# Patient Record
Sex: FEMALE | Race: WHITE | Employment: STUDENT | URBAN - METROPOLITAN AREA
[De-identification: names, ages, dates, MRNs, and addresses within clinical notes are randomized per-mention and may not be internally consistent; named-entity substitution may affect disease eponyms.]

---
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
## 2018-01-26 RX ORDER — PANTOPRAZOLE SODIUM 40 MG/1
40 TABLET, DELAYED RELEASE ORAL DAILY
Qty: 30 TABLET | Refills: 0 | Status: SHIPPED | OUTPATIENT
Start: 2018-01-26 | End: 2018-03-03 | Stop reason: SDUPTHER

## 2018-03-05 RX ORDER — PANTOPRAZOLE SODIUM 40 MG/1
40 TABLET, DELAYED RELEASE ORAL DAILY
Qty: 30 TABLET | Refills: 0 | Status: SHIPPED | OUTPATIENT
Start: 2018-03-05 | End: 2018-07-26 | Stop reason: SDUPTHER

## 2018-04-24 ENCOUNTER — HOSPITAL ENCOUNTER (EMERGENCY)
Age: 18
Discharge: HOME | End: 2018-04-24
Payer: COMMERCIAL

## 2018-04-24 ENCOUNTER — HOSPITAL ENCOUNTER (EMERGENCY)
Dept: GENERAL RADIOLOGY | Age: 18
Discharge: HOME | End: 2018-04-24
Payer: COMMERCIAL

## 2018-04-24 VITALS
DIASTOLIC BLOOD PRESSURE: 86 MMHG | OXYGEN SATURATION: 99 % | HEIGHT: 65 IN | RESPIRATION RATE: 16 BRPM | SYSTOLIC BLOOD PRESSURE: 131 MMHG | TEMPERATURE: 97.8 F | HEART RATE: 86 BPM | WEIGHT: 212 LBS | BODY MASS INDEX: 35.32 KG/M2

## 2018-04-24 DIAGNOSIS — J20.9 ACUTE BRONCHITIS DUE TO INFECTION: Primary | ICD-10-CM

## 2018-04-24 LAB
GROUP A STREP CULTURE, REFLEX: NEGATIVE
REFLEX THROAT C + S: NORMAL

## 2018-04-24 PROCEDURE — 99214 OFFICE O/P EST MOD 30 MIN: CPT

## 2018-04-24 PROCEDURE — 71046 X-RAY EXAM CHEST 2 VIEWS: CPT

## 2018-04-24 PROCEDURE — 87070 CULTURE OTHR SPECIMN AEROBIC: CPT

## 2018-04-24 PROCEDURE — 99213 OFFICE O/P EST LOW 20 MIN: CPT | Performed by: NURSE PRACTITIONER

## 2018-04-24 RX ORDER — AMOXICILLIN AND CLAVULANATE POTASSIUM 875; 125 MG/1; MG/1
1 TABLET, FILM COATED ORAL 2 TIMES DAILY
Qty: 20 TABLET | Refills: 0 | Status: SHIPPED | OUTPATIENT
Start: 2018-04-24 | End: 2018-05-04

## 2018-04-24 RX ORDER — METHYLPREDNISOLONE 4 MG/1
TABLET ORAL
Qty: 1 KIT | Refills: 0 | Status: SHIPPED | OUTPATIENT
Start: 2018-04-24 | End: 2018-06-28

## 2018-04-24 ASSESSMENT — ENCOUNTER SYMPTOMS
SHORTNESS OF BREATH: 1
RHINORRHEA: 0
COUGH: 1
WHEEZING: 1
CONSTIPATION: 0
DIARRHEA: 0
NAUSEA: 0
ABDOMINAL PAIN: 0
PHOTOPHOBIA: 0
VOMITING: 0
SORE THROAT: 1
APNEA: 0
BACK PAIN: 0
SINUS PRESSURE: 0

## 2018-04-26 LAB — THROAT/NOSE CULTURE: NORMAL

## 2018-06-28 ENCOUNTER — HOSPITAL ENCOUNTER (EMERGENCY)
Age: 18
Discharge: HOME | End: 2018-06-28
Payer: COMMERCIAL

## 2018-06-28 VITALS
OXYGEN SATURATION: 98 % | BODY MASS INDEX: 35.92 KG/M2 | HEIGHT: 65 IN | SYSTOLIC BLOOD PRESSURE: 155 MMHG | RESPIRATION RATE: 16 BRPM | DIASTOLIC BLOOD PRESSURE: 78 MMHG | WEIGHT: 215.6 LBS | TEMPERATURE: 97.8 F | HEART RATE: 69 BPM

## 2018-06-28 DIAGNOSIS — N94.10 DYSPAREUNIA IN FEMALE: Primary | ICD-10-CM

## 2018-06-28 DIAGNOSIS — N76.0 VAGINITIS AND VULVOVAGINITIS: ICD-10-CM

## 2018-06-28 LAB
BILIRUBIN URINE: NEGATIVE
BLOOD, URINE: NEGATIVE
CHARACTER, URINE: CLEAR
CHLAMYDIA TRACHOMATIS BY RT-PCR: NOT DETECTED
COLOR: ABNORMAL
CT/NG SOURCE: NORMAL
GLUCOSE, URINE: NEGATIVE MG/DL
KETONES, URINE: NEGATIVE
LEUKOCYTES, UA: ABNORMAL
NEISSERIA GONORRHOEAE BY RT-PCR: NOT DETECTED
NITRATE, UA: NEGATIVE
PH UA: 6 (ref 5–9)
PROTEIN UA: NEGATIVE MG/DL
REFLEX TO URINE C & S: ABNORMAL
SPECIFIC GRAVITY UA: 1.02 (ref 1–1.03)
TRICHOMONAS PREP: NEGATIVE
UROBILINOGEN, URINE: 0.2 EU/DL (ref 0–1)

## 2018-06-28 PROCEDURE — 81003 URINALYSIS AUTO W/O SCOPE: CPT

## 2018-06-28 PROCEDURE — 99214 OFFICE O/P EST MOD 30 MIN: CPT

## 2018-06-28 PROCEDURE — 87252 VIRUS INOCULATION TISSUE: CPT

## 2018-06-28 PROCEDURE — 99213 OFFICE O/P EST LOW 20 MIN: CPT | Performed by: NURSE PRACTITIONER

## 2018-06-28 PROCEDURE — 87253 VIRUS INOCULATE TISSUE ADDL: CPT

## 2018-06-28 PROCEDURE — 87491 CHLMYD TRACH DNA AMP PROBE: CPT

## 2018-06-28 PROCEDURE — 87070 CULTURE OTHR SPECIMN AEROBIC: CPT

## 2018-06-28 PROCEDURE — 87808 TRICHOMONAS ASSAY W/OPTIC: CPT

## 2018-06-28 PROCEDURE — 87205 SMEAR GRAM STAIN: CPT

## 2018-06-28 PROCEDURE — 87591 N.GONORRHOEAE DNA AMP PROB: CPT

## 2018-06-28 PROCEDURE — 87086 URINE CULTURE/COLONY COUNT: CPT

## 2018-06-28 RX ORDER — FLUCONAZOLE 40 MG/ML
150 POWDER, FOR SUSPENSION ORAL NIGHTLY
Qty: 11.4 ML | Refills: 0 | Status: SHIPPED | OUTPATIENT
Start: 2018-06-28 | End: 2018-07-01

## 2018-06-28 RX ORDER — GREEN TEA/HOODIA GORDONII 315-12.5MG
1 CAPSULE ORAL DAILY
Qty: 30 TABLET | Refills: 0 | Status: SHIPPED | OUTPATIENT
Start: 2018-06-28 | End: 2019-10-08 | Stop reason: ALTCHOICE

## 2018-06-28 ASSESSMENT — ENCOUNTER SYMPTOMS
DIARRHEA: 0
CONSTIPATION: 0
ANAL BLEEDING: 0
WHEEZING: 0
RECTAL PAIN: 0
NAUSEA: 0
STRIDOR: 0
ABDOMINAL DISTENTION: 0
VOMITING: 0
APNEA: 0
CHOKING: 0
COLOR CHANGE: 0
BACK PAIN: 1
CHEST TIGHTNESS: 0
BLOOD IN STOOL: 0
ABDOMINAL PAIN: 0

## 2018-06-30 LAB — URINE CULTURE REFLEX: NORMAL

## 2018-07-01 LAB
GENITAL CULTURE, ROUTINE: NORMAL
GRAM STAIN RESULT: NORMAL
HERPES SIMPLEX CULTURE: NORMAL

## 2018-07-26 NOTE — TELEPHONE ENCOUNTER
Date of last visit:  8/18/2017  Date of next visit:  10/12/2018    Requested Prescriptions      No prescriptions requested or ordered in this encounter

## 2018-07-27 RX ORDER — PANTOPRAZOLE SODIUM 40 MG/1
40 TABLET, DELAYED RELEASE ORAL DAILY
Qty: 30 TABLET | Refills: 0 | Status: SHIPPED | OUTPATIENT
Start: 2018-07-27 | End: 2018-11-26 | Stop reason: SDUPTHER

## 2018-10-12 ENCOUNTER — OFFICE VISIT (OUTPATIENT)
Dept: FAMILY MEDICINE CLINIC | Age: 18
End: 2018-10-12

## 2018-10-12 VITALS
HEIGHT: 65 IN | RESPIRATION RATE: 14 BRPM | WEIGHT: 224.2 LBS | BODY MASS INDEX: 37.36 KG/M2 | HEART RATE: 86 BPM | DIASTOLIC BLOOD PRESSURE: 72 MMHG | SYSTOLIC BLOOD PRESSURE: 114 MMHG

## 2018-10-12 DIAGNOSIS — Z13.220 SCREENING FOR HYPERLIPIDEMIA: ICD-10-CM

## 2018-10-12 DIAGNOSIS — E16.2 HYPOGLYCEMIA: ICD-10-CM

## 2018-10-12 DIAGNOSIS — L74.519 EXCESSIVE SWEATING, LOCAL: ICD-10-CM

## 2018-10-12 LAB — GLUCOSE BLD-MCNC: 88 MG/DL

## 2018-10-12 PROCEDURE — 82962 GLUCOSE BLOOD TEST: CPT | Performed by: FAMILY MEDICINE

## 2018-10-12 PROCEDURE — 99213 OFFICE O/P EST LOW 20 MIN: CPT | Performed by: FAMILY MEDICINE

## 2018-10-12 ASSESSMENT — PATIENT HEALTH QUESTIONNAIRE - PHQ9
SUM OF ALL RESPONSES TO PHQ QUESTIONS 1-9: 0
1. LITTLE INTEREST OR PLEASURE IN DOING THINGS: 0
SUM OF ALL RESPONSES TO PHQ QUESTIONS 1-9: 0
2. FEELING DOWN, DEPRESSED OR HOPELESS: 0
SUM OF ALL RESPONSES TO PHQ9 QUESTIONS 1 & 2: 0

## 2018-10-12 ASSESSMENT — ENCOUNTER SYMPTOMS
VOMITING: 0
DIARRHEA: 0
SHORTNESS OF BREATH: 0
CHEST TIGHTNESS: 0
ABDOMINAL PAIN: 0
CONSTIPATION: 0
NAUSEA: 0
COUGH: 0
EYES NEGATIVE: 1

## 2018-10-12 NOTE — PATIENT INSTRUCTIONS
Patient Education        Hypoglycemia: Care Instructions  Your Care Instructions    Hypoglycemia means that your blood sugar is low and your body is not getting enough fuel. Some people get low blood sugar from not eating often enough. Some medicines to treat diabetes can cause low blood sugar. People who have had surgery on their stomachs or intestines may get hypoglycemia. Problems with the pancreas, kidneys, or liver also can cause low blood sugar. A snack or drink with sugar in it will raise your blood sugar and should ease your symptoms right away. Your doctor may recommend that you change or stop your medicines until you can get your blood sugar levels under control. In the long run, you may need to change your diet and eating habits so that you get enough fuel for your body throughout the day. Follow-up care is a key part of your treatment and safety. Be sure to make and go to all appointments, and call your doctor if you are having problems. It's also a good idea to know your test results and keep a list of the medicines you take. How can you care for yourself at home? · Learn to recognize the early signs of low blood sugar. Signs include:  ¨ Nausea. ¨ Hunger. ¨ Feeling nervous, irritable, or shaky. ¨ Cold, clammy, wet skin. ¨ Sweating (when you are not exercising). ¨ A fast heartbeat. ¨ Numbness or tingling of the fingertips or lips. · If you feel an episode of low blood sugar coming on, drink fruit juice or sugared (not diet) soda, or eat sugar in the form of candy, cubes, or tablets. [employer] are another [employer]. · Eat small, frequent meals so that you do not get too hungry between meals. · Balance extra exercise with eating more. · Keep a written record of your low blood sugar episodes, including when you last ate and what you ate, so that you can learn what causes your blood sugar to drop.   · Make sure your family, friends, and coworkers know the symptoms of low blood sugar and

## 2018-10-12 NOTE — PROGRESS NOTES
touch. She states she is just sensitive but this has been going on for long time. Gastrointestinal: Negative for abdominal pain, constipation, diarrhea, nausea and vomiting. Genitourinary: Negative. Musculoskeletal: Negative. Skin: Negative. Negative for rash. Neurological: Negative for dizziness, syncope, weakness, light-headedness, numbness and headaches. Psychiatric/Behavioral: Negative. Objective:   /72 (Site: Right Upper Arm, Position: Sitting, Cuff Size: Medium Adult)   Pulse 86   Resp 14   Ht 5' 5\" (1.651 m)   Wt 224 lb 3.2 oz (101.7 kg)   Breastfeeding? No   BMI 37.31 kg/m²   Wt Readings from Last 3 Encounters:   10/12/18 224 lb 3.2 oz (101.7 kg)   06/28/18 215 lb 9.6 oz (97.8 kg)   04/24/18 212 lb (96.2 kg) (98 %, Z= 2.11)*     * Growth percentiles are based on [facility] 2-20 Years data. Physical Exam   Constitutional: She is oriented to person, place, and time. She appears well-developed and well-nourished. No distress. HENT:   Head: Normocephalic and atraumatic. Eyes: Pupils are equal, round, and reactive to light. Conjunctivae and EOM are normal. Right eye exhibits no discharge. Left eye exhibits no discharge. No scleral icterus. Neck: Normal range of motion. Neck supple. No JVD present. No thyromegaly present. Cardiovascular: Normal rate, regular rhythm and normal heart sounds. No murmur heard. Pulmonary/Chest: Breath sounds normal. No respiratory distress. She has no wheezes. She has no rhonchi. She has no rales. Abdominal: Soft. Bowel sounds are normal. She exhibits no distension and no mass. There is no hepatosplenomegaly. There is no tenderness. There is no rebound and no guarding. Musculoskeletal: Normal range of motion. Lymphadenopathy:     She has no cervical adenopathy. Neurological: She is alert and oriented to person, place, and time. Skin: Skin is warm and dry. No rash noted. She is not diaphoretic.    Psychiatric: She has a normal mood and affect. Her behavior is normal.   Nursing note and vitals reviewed. Assessment:       Diagnosis Orders   1. Hypoglycemia  POCT Glucose    Comprehensive Metabolic Panel    CBC with Differential    TSH with Reflex   2. Excessive sweating, local  Comprehensive Metabolic Panel    CBC with Differential    TSH with Reflex   3. Screening for hyperlipidemia  Lipid Panel       Plan:      Current Outpatient Prescriptions   Medication Sig Dispense Refill    pantoprazole (PROTONIX) 40 MG tablet Take 1 tablet by mouth daily 30 tablet 0    Lactobacillus (PROBIOTIC ACIDOPHILUS) TABS Take 1 tablet by mouth daily 30 tablet 0    DICYCLOMINE HCL PO Take by mouth      SPRINTEC 28 0.25-35 MG-MCG per tablet TAKE 1 TABLET BY MOUTH EVERY DAY  11    ibuprofen (ADVIL;MOTRIN) 600 MG tablet Take 600 mg by mouth every 6 hours as needed for Pain       No current facility-administered medications for this visit. Orders Placed This Encounter   Procedures    Comprehensive Metabolic Panel     Standing Status:   Future     Standing Expiration Date:   10/12/2019    CBC with Differential     Standing Status:   Future     Standing Expiration Date:   10/12/2019    Lipid Panel     Standing Status:   Future     Standing Expiration Date:   10/12/2019     Order Specific Question:   Is Patient Fasting?/# of Hours     Answer:   yes 12 hours    TSH with Reflex     Standing Status:   Future     Standing Expiration Date:   10/12/2019    POCT Glucose     No results found for: LABA1C  No results found for: Mernelll Boy  Results for POC orders placed in visit on 10/12/18   POCT Glucose   Result Value Ref Range    Glucose 88 mg/dL   Fasting today. Monitor blood sugars when she has symptoms. Sofia Bello Keep log of blood sugars and bring with you to the next appointment. All patient questions answered. Pt voiced understanding. Instructed to make sure she eats 3 meals with 3 snacks in between. Patient agreed with treatment plan.      Return in

## 2018-10-19 ENCOUNTER — OFFICE VISIT (OUTPATIENT)
Dept: FAMILY MEDICINE CLINIC | Age: 18
End: 2018-10-19

## 2018-10-19 VITALS
RESPIRATION RATE: 16 BRPM | SYSTOLIC BLOOD PRESSURE: 122 MMHG | DIASTOLIC BLOOD PRESSURE: 74 MMHG | BODY MASS INDEX: 37.65 KG/M2 | WEIGHT: 226 LBS | HEIGHT: 65 IN | HEART RATE: 68 BPM

## 2018-10-19 DIAGNOSIS — E16.2 HYPOGLYCEMIA: Primary | ICD-10-CM

## 2018-10-19 DIAGNOSIS — K21.9 GASTROESOPHAGEAL REFLUX DISEASE, ESOPHAGITIS PRESENCE NOT SPECIFIED: ICD-10-CM

## 2018-10-19 PROCEDURE — 99213 OFFICE O/P EST LOW 20 MIN: CPT | Performed by: FAMILY MEDICINE

## 2018-10-19 ASSESSMENT — ENCOUNTER SYMPTOMS
VOMITING: 0
CONSTIPATION: 0
CHEST TIGHTNESS: 0
SHORTNESS OF BREATH: 0
EYES NEGATIVE: 1
COUGH: 0
DIARRHEA: 0
NAUSEA: 0
ABDOMINAL PAIN: 0

## 2018-10-19 NOTE — PROGRESS NOTES
tablet Take 600 mg by mouth every 6 hours as needed for Pain       No current facility-administered medications for this visit. Orders Placed This Encounter   Procedures    External Referral To Endocrinology     Referral Priority:   Routine     Referral Type:   Eval and Treat     Referral Reason:   Specialty Services Required     Referred to Provider:   Pramod Casarez MD     Requested Specialty:   Endocrinology     Number of Visits Requested:   1     Continue current medications. Return if symptoms worsen or fail to improve. Discusseduse, benefit, and side effects of prescribed medications. All patient questionsanswered. Pt voiced understanding. Instructed to continue current medications,diet and exercise. Patient agreed with treatment plan. She has not done her blood work yet.

## 2018-10-22 ENCOUNTER — TELEPHONE (OUTPATIENT)
Dept: FAMILY MEDICINE CLINIC | Age: 18
End: 2018-10-22

## 2018-11-26 DIAGNOSIS — N39.0 URINARY TRACT INFECTION WITH HEMATURIA, SITE UNSPECIFIED: Primary | ICD-10-CM

## 2018-11-26 DIAGNOSIS — R31.9 URINARY TRACT INFECTION WITH HEMATURIA, SITE UNSPECIFIED: Primary | ICD-10-CM

## 2018-11-26 RX ORDER — PANTOPRAZOLE SODIUM 40 MG/1
40 TABLET, DELAYED RELEASE ORAL DAILY
Qty: 30 TABLET | Refills: 1 | Status: SHIPPED | OUTPATIENT
Start: 2018-11-26

## 2018-11-26 RX ORDER — SULFAMETHOXAZOLE AND TRIMETHOPRIM 800; 160 MG/1; MG/1
1 TABLET ORAL 2 TIMES DAILY
Qty: 14 TABLET | Refills: 0 | Status: SHIPPED | OUTPATIENT
Start: 2018-11-26 | End: 2018-12-06

## 2018-11-26 NOTE — TELEPHONE ENCOUNTER
Patient called req ref of Pantoprazole to [facility]. Also C/O hematuria, burns with urination, frequency and only little bit at a time. Urgency constantly. Just seen in October and not able to come in for an appt. Req RX to [facility].   Please call her [phone]

## 2018-11-27 ENCOUNTER — HOSPITAL ENCOUNTER (OUTPATIENT)
Age: 18
Setting detail: SPECIMEN
Discharge: HOME | End: 2018-11-27
Payer: COMMERCIAL

## 2018-11-27 LAB
BACTERIA: ABNORMAL /HPF
BILIRUBIN URINE: NEGATIVE
BLOOD, URINE: ABNORMAL
CASTS UA: ABNORMAL /LPF
CHARACTER, URINE: ABNORMAL
COLOR: YELLOW
CRYSTALS, UA: ABNORMAL
EPITHELIAL CELLS, UA: ABNORMAL /HPF
GLUCOSE URINE: NEGATIVE MG/DL
KETONES, URINE: NEGATIVE
LEUKOCYTE ESTERASE, URINE: ABNORMAL
NITRITE, URINE: POSITIVE
PH UA: 5.5
PROTEIN UA: 30
RBC URINE: ABNORMAL /HPF
RENAL EPITHELIAL, UA: ABNORMAL
SPECIFIC GRAVITY, URINE: 1.02 (ref 1–1.03)
UROBILINOGEN, URINE: 0.2 EU/DL
WBC UA: ABNORMAL /HPF
YEAST: ABNORMAL

## 2018-11-27 PROCEDURE — 81001 URINALYSIS AUTO W/SCOPE: CPT

## 2018-11-27 PROCEDURE — 87186 SC STD MICRODIL/AGAR DIL: CPT

## 2018-11-27 PROCEDURE — 87184 SC STD DISK METHOD PER PLATE: CPT

## 2018-11-27 PROCEDURE — 87086 URINE CULTURE/COLONY COUNT: CPT

## 2018-11-27 PROCEDURE — 87077 CULTURE AEROBIC IDENTIFY: CPT

## 2018-11-29 ENCOUNTER — TELEPHONE (OUTPATIENT)
Dept: FAMILY MEDICINE CLINIC | Age: 18
End: 2018-11-29

## 2018-11-29 LAB
ORGANISM: ABNORMAL
URINE CULTURE REFLEX: ABNORMAL

## 2019-01-17 ENCOUNTER — HOSPITAL ENCOUNTER (EMERGENCY)
Age: 19
Discharge: HOME | End: 2019-01-17
Payer: COMMERCIAL

## 2019-01-17 VITALS
OXYGEN SATURATION: 99 % | BODY MASS INDEX: 36.96 KG/M2 | HEART RATE: 112 BPM | WEIGHT: 230 LBS | RESPIRATION RATE: 16 BRPM | TEMPERATURE: 98.2 F | DIASTOLIC BLOOD PRESSURE: 85 MMHG | HEIGHT: 66 IN | SYSTOLIC BLOOD PRESSURE: 142 MMHG

## 2019-01-17 DIAGNOSIS — J02.9 VIRAL PHARYNGITIS: Primary | ICD-10-CM

## 2019-01-17 LAB
GROUP A STREP CULTURE, REFLEX: NEGATIVE
REFLEX THROAT C + S: NORMAL

## 2019-01-17 PROCEDURE — 87070 CULTURE OTHR SPECIMN AEROBIC: CPT

## 2019-01-17 PROCEDURE — 99213 OFFICE O/P EST LOW 20 MIN: CPT | Performed by: NURSE PRACTITIONER

## 2019-01-17 PROCEDURE — 99213 OFFICE O/P EST LOW 20 MIN: CPT

## 2019-01-17 ASSESSMENT — ENCOUNTER SYMPTOMS
CHEST TIGHTNESS: 0
SINUS PRESSURE: 0
ABDOMINAL PAIN: 0
SORE THROAT: 1
SHORTNESS OF BREATH: 0
RHINORRHEA: 0
NAUSEA: 0
VOMITING: 0
DIARRHEA: 0

## 2019-01-17 ASSESSMENT — PAIN - FUNCTIONAL ASSESSMENT: PAIN_FUNCTIONAL_ASSESSMENT: ACTIVITIES ARE NOT PREVENTED

## 2019-01-17 ASSESSMENT — PAIN DESCRIPTION - LOCATION: LOCATION: THROAT

## 2019-01-17 ASSESSMENT — PAIN SCALES - GENERAL: PAINLEVEL_OUTOF10: 8

## 2019-01-17 ASSESSMENT — PAIN DESCRIPTION - PAIN TYPE: TYPE: ACUTE PAIN

## 2019-01-19 LAB — THROAT/NOSE CULTURE: NORMAL

## 2019-10-08 ENCOUNTER — HOSPITAL ENCOUNTER (EMERGENCY)
Age: 19
Discharge: HOME | End: 2019-10-08
Payer: COMMERCIAL

## 2019-10-08 VITALS
HEART RATE: 83 BPM | DIASTOLIC BLOOD PRESSURE: 83 MMHG | RESPIRATION RATE: 16 BRPM | WEIGHT: 237 LBS | SYSTOLIC BLOOD PRESSURE: 156 MMHG | OXYGEN SATURATION: 96 % | BODY MASS INDEX: 39.49 KG/M2 | HEIGHT: 65 IN | TEMPERATURE: 98.6 F

## 2019-10-08 DIAGNOSIS — N30.01 ACUTE CYSTITIS WITH HEMATURIA: Primary | ICD-10-CM

## 2019-10-08 LAB
BILIRUBIN URINE: NEGATIVE
BLOOD, URINE: ABNORMAL
CHARACTER, URINE: ABNORMAL
COLOR: ABNORMAL
GLUCOSE, URINE: NEGATIVE MG/DL
KETONES, URINE: NEGATIVE
LEUKOCYTES, UA: ABNORMAL
NITRATE, UA: POSITIVE
PH UA: 5.5 (ref 5–9)
PROTEIN UA: >= 300 MG/DL
REFLEX TO URINE C & S: ABNORMAL
SPECIFIC GRAVITY UA: >= 1.03 (ref 1–1.03)
UROBILINOGEN, URINE: 0.2 EU/DL (ref 0–1)

## 2019-10-08 PROCEDURE — 99213 OFFICE O/P EST LOW 20 MIN: CPT

## 2019-10-08 PROCEDURE — 87086 URINE CULTURE/COLONY COUNT: CPT

## 2019-10-08 PROCEDURE — 87077 CULTURE AEROBIC IDENTIFY: CPT

## 2019-10-08 PROCEDURE — 81003 URINALYSIS AUTO W/O SCOPE: CPT

## 2019-10-08 PROCEDURE — 99213 OFFICE O/P EST LOW 20 MIN: CPT | Performed by: NURSE PRACTITIONER

## 2019-10-08 PROCEDURE — 87186 SC STD MICRODIL/AGAR DIL: CPT

## 2019-10-08 RX ORDER — SULFAMETHOXAZOLE AND TRIMETHOPRIM 800; 160 MG/1; MG/1
1 TABLET ORAL 2 TIMES DAILY
Qty: 10 TABLET | Refills: 0 | Status: SHIPPED | OUTPATIENT
Start: 2019-10-08 | End: 2019-10-13

## 2019-10-08 ASSESSMENT — ENCOUNTER SYMPTOMS
BACK PAIN: 1
SHORTNESS OF BREATH: 0
VOMITING: 0
ABDOMINAL PAIN: 1
NAUSEA: 0

## 2019-10-08 ASSESSMENT — PAIN DESCRIPTION - PROGRESSION: CLINICAL_PROGRESSION: GRADUALLY WORSENING

## 2019-10-08 ASSESSMENT — PAIN DESCRIPTION - PAIN TYPE: TYPE: ACUTE PAIN

## 2019-10-08 ASSESSMENT — PAIN DESCRIPTION - LOCATION: LOCATION: BACK

## 2019-10-08 ASSESSMENT — PAIN DESCRIPTION - FREQUENCY: FREQUENCY: CONTINUOUS

## 2019-10-08 ASSESSMENT — PAIN DESCRIPTION - DESCRIPTORS: DESCRIPTORS: ACHING

## 2019-10-08 ASSESSMENT — PAIN SCALES - GENERAL: PAINLEVEL_OUTOF10: 5

## 2019-10-08 ASSESSMENT — PAIN DESCRIPTION - ONSET: ONSET: GRADUAL

## 2019-10-10 LAB
ORGANISM: ABNORMAL
URINE CULTURE REFLEX: ABNORMAL

## 2020-02-06 ENCOUNTER — TELEPHONE (OUTPATIENT)
Dept: FAMILY MEDICINE CLINIC | Age: 20
End: 2020-02-06

## 2020-02-06 RX ORDER — CIPROFLOXACIN 500 MG/1
500 TABLET, FILM COATED ORAL 2 TIMES DAILY
Qty: 14 TABLET | Refills: 0 | Status: SHIPPED | OUTPATIENT
Start: 2020-02-06 | End: 2020-02-13

## 2020-02-12 NOTE — TELEPHONE ENCOUNTER
Unable to reach patient. Called [facility] and spoke with Roger Hooks. She stated pt picked up RX on 02/07/2020.

## 2020-06-08 ENCOUNTER — OFFICE VISIT (OUTPATIENT)
Dept: FAMILY MEDICINE CLINIC | Age: 20
End: 2020-06-08

## 2020-06-08 VITALS
WEIGHT: 244.5 LBS | TEMPERATURE: 97.4 F | DIASTOLIC BLOOD PRESSURE: 74 MMHG | SYSTOLIC BLOOD PRESSURE: 124 MMHG | HEART RATE: 72 BPM | BODY MASS INDEX: 40.73 KG/M2 | HEIGHT: 65 IN | RESPIRATION RATE: 16 BRPM

## 2020-06-08 PROCEDURE — 86580 TB INTRADERMAL TEST: CPT | Performed by: FAMILY MEDICINE

## 2020-06-08 PROCEDURE — 99213 OFFICE O/P EST LOW 20 MIN: CPT | Performed by: FAMILY MEDICINE

## 2020-06-08 RX ORDER — DICYCLOMINE HCL 20 MG
40 TABLET ORAL DAILY
COMMUNITY

## 2020-06-08 ASSESSMENT — PATIENT HEALTH QUESTIONNAIRE - PHQ9
SUM OF ALL RESPONSES TO PHQ QUESTIONS 1-9: 0
2. FEELING DOWN, DEPRESSED OR HOPELESS: 0
SUM OF ALL RESPONSES TO PHQ9 QUESTIONS 1 & 2: 0
1. LITTLE INTEREST OR PLEASURE IN DOING THINGS: 0
SUM OF ALL RESPONSES TO PHQ QUESTIONS 1-9: 0

## 2020-06-08 ASSESSMENT — ENCOUNTER SYMPTOMS
EYES NEGATIVE: 1
COUGH: 0
ABDOMINAL PAIN: 0
CHEST TIGHTNESS: 0
VOMITING: 0
SHORTNESS OF BREATH: 0
CONSTIPATION: 0
DIARRHEA: 0
NAUSEA: 0

## 2020-06-08 NOTE — PROGRESS NOTES
Immunizations Administered     Name Date Dose Route    PPD Test 6/8/2020 0.1 mL Intradermal    Site: Right arm    Lot: [id number]    NDC: 41441-774-71

## 2020-06-10 ENCOUNTER — NURSE ONLY (OUTPATIENT)
Dept: FAMILY MEDICINE CLINIC | Age: 20
End: 2020-06-10

## 2020-06-15 ENCOUNTER — NURSE ONLY (OUTPATIENT)
Dept: FAMILY MEDICINE CLINIC | Age: 20
End: 2020-06-15

## 2020-06-15 PROCEDURE — 86580 TB INTRADERMAL TEST: CPT | Performed by: FAMILY MEDICINE

## 2020-06-17 ENCOUNTER — NURSE ONLY (OUTPATIENT)
Dept: FAMILY MEDICINE CLINIC | Age: 20
End: 2020-06-17

## 2020-11-20 ENCOUNTER — HOSPITAL ENCOUNTER (EMERGENCY)
Age: 20
Discharge: HOME | End: 2020-11-20
Payer: COMMERCIAL

## 2020-11-20 ENCOUNTER — TELEPHONE (OUTPATIENT)
Dept: FAMILY MEDICINE CLINIC | Age: 20
End: 2020-11-20

## 2020-11-20 VITALS
HEART RATE: 83 BPM | TEMPERATURE: 97.6 F | SYSTOLIC BLOOD PRESSURE: 140 MMHG | BODY MASS INDEX: 40.65 KG/M2 | HEIGHT: 65 IN | WEIGHT: 244 LBS | RESPIRATION RATE: 16 BRPM | OXYGEN SATURATION: 99 % | DIASTOLIC BLOOD PRESSURE: 94 MMHG

## 2020-11-20 PROCEDURE — 99212 OFFICE O/P EST SF 10 MIN: CPT

## 2020-11-20 PROCEDURE — 99213 OFFICE O/P EST LOW 20 MIN: CPT | Performed by: NURSE PRACTITIONER

## 2020-11-20 RX ORDER — ALBUTEROL SULFATE 90 UG/1
2 AEROSOL, METERED RESPIRATORY (INHALATION) 4 TIMES DAILY PRN
Qty: 1 INHALER | Refills: 0 | Status: SHIPPED | OUTPATIENT
Start: 2020-11-20

## 2020-11-20 RX ORDER — AMOXICILLIN AND CLAVULANATE POTASSIUM 875; 125 MG/1; MG/1
1 TABLET, FILM COATED ORAL 2 TIMES DAILY
Qty: 10 TABLET | Refills: 0 | Status: SHIPPED | OUTPATIENT
Start: 2020-11-20 | End: 2020-11-25

## 2020-11-20 RX ORDER — CETIRIZINE HYDROCHLORIDE 10 MG/1
10 TABLET ORAL DAILY
Qty: 30 TABLET | Refills: 0 | Status: SHIPPED | OUTPATIENT
Start: 2020-11-20 | End: 2020-12-20

## 2020-11-20 RX ORDER — AZELASTINE 1 MG/ML
1 SPRAY, METERED NASAL 2 TIMES DAILY
Qty: 1 BOTTLE | Refills: 0 | Status: SHIPPED | OUTPATIENT
Start: 2020-11-20

## 2020-11-20 ASSESSMENT — ENCOUNTER SYMPTOMS
SORE THROAT: 1
FACIAL SWELLING: 0
VOICE CHANGE: 0
CHEST TIGHTNESS: 1
SINUS PRESSURE: 1
RHINORRHEA: 1
SHORTNESS OF BREATH: 0
CHOKING: 0
SINUS PAIN: 0
WHEEZING: 1
SWOLLEN GLANDS: 1
APNEA: 0
COUGH: 1
COLOR CHANGE: 0
STRIDOR: 0
TROUBLE SWALLOWING: 0
BACK PAIN: 0

## 2020-11-20 NOTE — ED PROVIDER NOTES
[facility]  Urgent Care Encounter      CHIEF COMPLAINT       Chief Complaint   Patient presents with    Cough       Nurses Notes reviewed and I agree except as noted in the HPI. HISTORY OFPRESENT ILLNESS   Bautista Cornejo is a 23 y.o. HPI       The history is provided by the patient. No  was used. URI   Presenting symptoms: congestion, cough, ear pain, fatigue, rhinorrhea and sore throat    Presenting symptoms: no facial pain and no fever    Severity:  Mild  Onset quality:  Gradual  Duration:  2 weeks  Timing:  Constant  Progression:  Unchanged  Chronicity:  New  Relieved by:  OTC medications (Ibuprofen-last dose on Monday, Nasal [place]-last dose on Sunday)  Worsened by:  Certain positions  Ineffective treatments:  OTC medications (Ibuprofen-last dose on Monday, Nasal [place]-last dose on Sunday)  Associated symptoms: headaches, myalgias, sneezing, swollen glands and wheezing    Associated symptoms: no arthralgias, no neck pain and no sinus pain    Associated symptoms comment:  Headache x 5 days. Wheezing is primarily at night. Risk factors: not elderly, no chronic cardiac disease, no chronic kidney disease, no chronic respiratory disease, no diabetes mellitus, no immunosuppression, no recent illness, no recent travel and no sick contacts       REVIEW OF SYSTEMS     Review of Systems  Constitutional: Positive for appetite change and fatigue. Negative for activity change, chills, diaphoresis, fever and unexpected weight change. HENT: Positive for congestion, ear pain, postnasal drip, rhinorrhea, sinus pressure, sneezing and sore throat. Negative for dental problem, drooling, ear discharge, facial swelling, hearing loss, mouth sores, nosebleeds, sinus pain, tinnitus, trouble swallowing and voice change. Respiratory: Positive for cough, chest tightness and wheezing. Negative for apnea, choking, shortness of breath and stridor.     Musculoskeletal: Positive for myalgias. Negative for arthralgias, back pain, gait problem, joint swelling, neck pain and neck stiffness. Skin: Negative for color change, pallor, rash and wound. Neurological: Positive for headaches. Psychiatric/Behavioral: Positive for sleep disturbance. Negative for agitation, behavioral problems, confusion, decreased concentration, dysphoric mood, hallucinations, self-injury and suicidal ideas. The patient is not nervous/anxious and is not hyperactive. PAST MEDICAL HISTORY         Diagnosis Date    Acid reflux     IBS (irritable bowel syndrome)     Lactose intolerance     UTI (urinary tract infection)     history of nephritis once       SURGICAL HISTORY     Patient  has a past surgical history that includes Tonsillectomy and Adenoidectomy. CURRENT MEDICATIONS       Discharge Medication List as of 11/20/2020 12:12 PM      CONTINUE these medications which have NOT CHANGED    Details   dicyclomine (BENTYL) 20 MG tablet Take 40 mg by mouth dailyHistorical Med      pantoprazole (PROTONIX) 40 MG tablet Take 1 tablet by mouth daily, Disp-30 tablet, R-1Normal      SPRINTEC 28 0.25-35 MG-MCG per tablet TAKE 1 TABLET BY MOUTH EVERY DAY, R-11, DAWHistorical Med             ALLERGIES     Patient is is allergic to red dye. FAMILY HISTORY     Patient's family history includes Asthma in her father; COPD in her father; High Blood Pressure in her mother. SOCIAL HISTORY     Patient  reports that she has never smoked. She has never used smokeless tobacco. She reports that she does not drink alcohol or use drugs. PHYSICAL EXAM     ED TRIAGE VITALS  BP: (!) 140/94, Temp: 97.6 °F (36.4 °C), Pulse: 83, Resp: 16, SpO2: 99 %  Physical Exam  Physical Exam  Vitals signs reviewed. Constitutional:       General: She is awake. She is not in acute distress. Appearance: Normal appearance. She is well-developed and well-groomed. She is obese. She is ill-appearing. She is not toxic-appearing or diaphoretic. Interventions: She is not intubated. HENT:      Head: Normocephalic and atraumatic. Right Ear: Hearing and external ear normal. There is impacted cerumen. Left Ear: Hearing and external ear normal. There is impacted cerumen. Nose: Congestion and rhinorrhea present. No nasal deformity, septal deviation, signs of injury, laceration or nasal tenderness. Mouth/Throat:      Lips: Pink. No lesions. Mouth: Mucous membranes are moist. No injury, lacerations, oral lesions or angioedema. Dentition: Normal dentition. Does not have dentures. No dental tenderness, gingival swelling, dental caries, dental abscesses or gum lesions. Tongue: No lesions. Tongue does not deviate from midline. Palate: No mass and lesions. Pharynx: Oropharynx is clear. Uvula midline. Posterior oropharyngeal erythema present. No pharyngeal swelling, oropharyngeal exudate or uvula swelling. Neck:      Musculoskeletal: Normal range of motion. Cardiovascular:      Rate and Rhythm: Normal rate. Heart sounds: Normal heart sounds, S1 normal and S2 normal. Heart sounds not distant. No murmur. No friction rub. No gallop. Pulmonary:      Effort: Pulmonary effort is normal. No tachypnea, bradypnea, accessory muscle usage, prolonged expiration, respiratory distress or retractions. She is not intubated. Breath sounds: Normal breath sounds. No stridor or decreased air movement. No decreased breath sounds, wheezing, rhonchi or rales. Chest:      Chest wall: Tenderness present. Musculoskeletal: Normal range of motion. Skin:     General: Skin is warm and dry. Coloration: Skin is not jaundiced or pale. Findings: No bruising, erythema, lesion or rash. Neurological:      Mental Status: She is alert, oriented to person, place, and time and easily aroused. Psychiatric:         Mood and Affect: Mood normal.         Behavior: Behavior normal. Behavior is cooperative.          Thought Content: Thought content normal.         Judgment: Judgment normal.    DIAGNOSTIC RESULTS   Labs:No results found for this visit on 11/20/20. IMAGING:  No orders to display     URGENT CARE COURSE:     Vitals:    11/20/20 1145   BP: (!) 140/94   Pulse: 83   Resp: 16   Temp: 97.6 °F (36.4 °C)   TempSrc: Temporal   SpO2: 99%   Weight: 244 lb (110.7 kg)   Height: 5' 5\" (1.651 m)       Medications - No data to display  PROCEDURES:  None  FINAL IMPRESSION      1. Acute maxillary sinusitis, recurrence not specified        DISPOSITION/PLAN   Decision To Discharge  Drink lots of fluids  Take Motrin or Tylenol for headache  Humidification of air  Use nasal spray as directed  Take a nasal decongestant as directed  Monitor for any fever increased and sinus pain or pressure  Follow-up see her primary care provider in 48 hours  Or go to the emergency department for any changes or concerns.     PATIENT REFERRED TO:  Zhanna Sena MD  [address]  [phone]      As needed    DISCHARGE MEDICATIONS:  Discharge Medication List as of 11/20/2020 12:12 PM      START taking these medications    Details   amoxicillin-clavulanate (AUGMENTIN) 875-125 MG per tablet Take 1 tablet by mouth 2 times daily for 5 days, Disp-10 tablet,R-0Normal      azelastine (ASTELIN) 0.1 % nasal spray 1 spray by Nasal route 2 times daily Use in each nostril as directed, Disp-1 Bottle,R-0Normal      cetirizine (ZYRTEC ALLERGY) 10 MG tablet Take 1 tablet by mouth daily, Disp-30 tablet,R-0Normal      albuterol sulfate HFA (VENTOLIN HFA) 108 (90 Base) MCG/ACT inhaler Inhale 2 puffs into the lungs 4 times daily as needed for Wheezing or Shortness of Breath, Disp-1 Inhaler,R-0Normal           Discharge Medication List as of 11/20/2020 12:12 PM          Samella Chimera, APRN - [address] YESSI - CNP  11/20/20 1218

## 2020-11-20 NOTE — ED TRIAGE NOTES
Patient ambulated to room with complaint of cough and wheezing and loss of appetite. States she is concerned for pneumonia.

## 2020-11-20 NOTE — TELEPHONE ENCOUNTER
Benson Dolan, mom called said that her daughters roommates boyfriend was at their college apartment this past Sunday. He was fine when he first got there but he started developing symptoms Sunday evening. The roommates boyfriend is waiting for his Covid results. Jeff Hanna is not having any symptoms but they are wondering if she should be tested? Jeff Hanna is back home now.     Benson Dolan can be reached at [phone]

## 2020-11-20 NOTE — ED PROVIDER NOTES
**This is a Medical/ PA/ APRN Student Note and is charted for educational purposes. The non-physician staff attested note is not to be used for billing purposes or to guide patient care. Please see the physician modifications/ attestation for treatment plan/suggestions. This note has been reviewed and feedback has been provided to the student. *     [facility]  Urgent Care Encounter       CHIEF COMPLAINT       Chief Complaint   Patient presents with    Cough       Nurses Notes reviewed and I agree except as noted in the HPI. HISTORY OF PRESENT ILLNESS   Olivia Palencia is a 23 y.o. female who presents to the [facility] [address] [facility] for evaluation. The history is provided by the patient. No  was used. URI   Presenting symptoms: congestion, cough, ear pain, fatigue, rhinorrhea and sore throat    Presenting symptoms: no facial pain and no fever    Severity:  Mild  Onset quality:  Gradual  Duration:  2 weeks  Timing:  Constant  Progression:  Unchanged  Chronicity:  New  Relieved by:  OTC medications (Ibuprofen-last dose on Monday, Nasal [place]-last dose on Sunday)  Worsened by:  Certain positions  Ineffective treatments:  OTC medications (Ibuprofen-last dose on Monday, Nasal Sp[place]-last dose on Sunday)  Associated symptoms: headaches, myalgias, sneezing, swollen glands and wheezing    Associated symptoms: no arthralgias, no neck pain and no sinus pain    Associated symptoms comment:  Headache x 5 days. Wheezing is primarily at night. Risk factors: not elderly, no chronic cardiac disease, no chronic kidney disease, no chronic respiratory disease, no diabetes mellitus, no immunosuppression, no recent illness, no recent travel and no sick contacts        REVIEW OF SYSTEMS     Review of Systems   Constitutional: Positive for appetite change and fatigue. Negative for activity change, chills, diaphoresis, fever and unexpected weight change.    HENT: Positive for congestion, ear pain, postnasal drip, rhinorrhea, sinus pressure, sneezing and sore throat. Negative for dental problem, drooling, ear discharge, facial swelling, hearing loss, mouth sores, nosebleeds, sinus pain, tinnitus, trouble swallowing and voice change. Respiratory: Positive for cough, chest tightness and wheezing. Negative for apnea, choking, shortness of breath and stridor. Musculoskeletal: Positive for myalgias. Negative for arthralgias, back pain, gait problem, joint swelling, neck pain and neck stiffness. Skin: Negative for color change, pallor, rash and wound. Neurological: Positive for headaches. Psychiatric/Behavioral: Positive for sleep disturbance. Negative for agitation, behavioral problems, confusion, decreased concentration, dysphoric mood, hallucinations, self-injury and suicidal ideas. The patient is not nervous/anxious and is not hyperactive. PAST MEDICAL HISTORY         Diagnosis Date    Acid reflux     IBS (irritable bowel syndrome)     Lactose intolerance     UTI (urinary tract infection)     history of nephritis once       SURGICALHISTORY     Patient  has a past surgical history that includes Tonsillectomy and Adenoidectomy. CURRENT MEDICATIONS       Previous Medications    DICYCLOMINE (BENTYL) 20 MG TABLET    Take 40 mg by mouth daily    IBUPROFEN (ADVIL;MOTRIN) 600 MG TABLET    Take 600 mg by mouth every 6 hours as needed for Pain    PANTOPRAZOLE (PROTONIX) 40 MG TABLET    Take 1 tablet by mouth daily    SPRINTEC 28 0.25-35 MG-MCG PER TABLET    TAKE 1 TABLET BY MOUTH EVERY DAY       ALLERGIES     Patient is is allergic to red dye.     Patients   Immunization History   Administered Date(s) Administered    DTaP/IPV (Foster Zoya) 1998, 04/08/1999, 07/27/1999, 07/30/2003, 10/13/2010    Hepatitis B Ped/Adol (Engerix-B, Recombivax HB) 1998, 1998, 04/08/1999    Hib PRP-OMP (PedvaxHIB) 1998, 04/08/1999, 07/27/1999    Meningococcal MCV4P (Menactra) 07/27/1999, 07/30/2003    PPD Test 06/08/2020, 06/15/2020    Polio IPV (IPOL) 1998, 04/08/1999, 07/30/2003    Tdap (Boostrix, Adacel) 10/13/2010    Varicella (Varivax) 07/30/2003       FAMILY HISTORY     Patient's family history includes Asthma in her father; COPD in her father; High Blood Pressure in her mother. SOCIAL HISTORY     Patient  reports that she has never smoked. She has never used smokeless tobacco. She reports that she does not drink alcohol or use drugs. PHYSICAL EXAM     ED TRIAGE VITALS   ,  ,  ,  ,  ,Estimated body mass index is 40.69 kg/m² as calculated from the following:    Height as of 6/8/20: 5' 5\" (1.651 m). Weight as of 6/8/20: 244 lb 8 oz (110.9 kg). ,No LMP recorded. (Menstrual status: Other - See Notes). Physical Exam  Vitals signs reviewed. Constitutional:       General: She is awake. She is not in acute distress. Appearance: Normal appearance. She is well-developed and well-groomed. She is obese. She is ill-appearing. She is not toxic-appearing or diaphoretic. Interventions: She is not intubated. HENT:      Head: Normocephalic and atraumatic. Right Ear: Hearing and external ear normal. There is impacted cerumen. Left Ear: Hearing and external ear normal. There is impacted cerumen. Nose: Congestion and rhinorrhea present. No nasal deformity, septal deviation, signs of injury, laceration or nasal tenderness. Mouth/Throat:      Lips: Pink. No lesions. Mouth: Mucous membranes are moist. No injury, lacerations, oral lesions or angioedema. Dentition: Normal dentition. Does not have dentures. No dental tenderness, gingival swelling, dental caries, dental abscesses or gum lesions. Tongue: No lesions. Tongue does not deviate from midline. Palate: No mass and lesions. Pharynx: Oropharynx is clear. Uvula midline. Posterior oropharyngeal erythema present. No pharyngeal swelling, oropharyngeal exudate or uvula swelling. Neck:      Musculoskeletal: Normal range of motion. Cardiovascular:      Rate and Rhythm: Normal rate. Heart sounds: Normal heart sounds, S1 normal and S2 normal. Heart sounds not distant. No murmur. No friction rub. No gallop. Pulmonary:      Effort: Pulmonary effort is normal. No tachypnea, bradypnea, accessory muscle usage, prolonged expiration, respiratory distress or retractions. She is not intubated. Breath sounds: Normal breath sounds. No stridor or decreased air movement. No decreased breath sounds, wheezing, rhonchi or rales. Chest:      Chest wall: Tenderness present. Musculoskeletal: Normal range of motion. Skin:     General: Skin is warm and dry. Coloration: Skin is not jaundiced or pale. Findings: No bruising, erythema, lesion or rash. Neurological:      Mental Status: She is alert, oriented to person, place, and time and easily aroused. Psychiatric:         Mood and Affect: Mood normal.         Behavior: Behavior normal. Behavior is cooperative. Thought Content: Thought content normal.         Judgment: Judgment normal.         DIAGNOSTIC RESULTS     Labs:No results found for this visit on 11/20/20. IMAGING:    No orders to display         EKG:      URGENT CARE COURSE:     There were no vitals filed for this visit. Medications - No data to display         PROCEDURES:  None    FINAL IMPRESSION      No diagnosis found. DISPOSITION/ PLAN           PATIENT REFERRED TO:  Blanka Collins MD  [address] [place] [id number]      DISCHARGE MEDICATIONS:  New Prescriptions    No medications on file       Discontinued Medications    No medications on file       Current Discharge Medication List          Brigette Salinas    (Please note that portions of this note were completed with a voice recognition program. Efforts were made to edit the dictations but occasionally words are mis-transcribed.)    DIMAS Recio. Richard Stinson RN, [address] [place] [address] Nurse Practitioner

## 2022-03-06 ENCOUNTER — HOSPITAL ENCOUNTER (EMERGENCY)
Age: 22
Discharge: HOME | End: 2022-03-06
Payer: COMMERCIAL

## 2022-03-06 VITALS
OXYGEN SATURATION: 98 % | DIASTOLIC BLOOD PRESSURE: 81 MMHG | RESPIRATION RATE: 14 BRPM | HEART RATE: 75 BPM | WEIGHT: 255.25 LBS | TEMPERATURE: 96.9 F | HEIGHT: 66 IN | BODY MASS INDEX: 41.02 KG/M2 | SYSTOLIC BLOOD PRESSURE: 117 MMHG

## 2022-03-06 DIAGNOSIS — R31.21 ASYMPTOMATIC MICROSCOPIC HEMATURIA: Primary | ICD-10-CM

## 2022-03-06 LAB
BILIRUBIN URINE: NEGATIVE
BLOOD, URINE: ABNORMAL
CHARACTER, URINE: ABNORMAL
COLOR: ABNORMAL
GLUCOSE URINE: NEGATIVE MG/DL
KETONES, URINE: NEGATIVE
LEUKOCYTE ESTERASE, URINE: NEGATIVE
NITRITE, URINE: NEGATIVE
PH UA: 6.5 (ref 5–9)
PROTEIN UA: NEGATIVE MG/DL
SPECIFIC GRAVITY UA: 1.02 (ref 1–1.03)
UROBILINOGEN, URINE: 0.2 EU/DL (ref 0.2–1)

## 2022-03-06 PROCEDURE — 99213 OFFICE O/P EST LOW 20 MIN: CPT | Performed by: NURSE PRACTITIONER

## 2022-03-06 PROCEDURE — 99213 OFFICE O/P EST LOW 20 MIN: CPT

## 2022-03-06 PROCEDURE — 81003 URINALYSIS AUTO W/O SCOPE: CPT

## 2022-03-06 ASSESSMENT — PAIN DESCRIPTION - PROGRESSION: CLINICAL_PROGRESSION: GRADUALLY IMPROVING

## 2022-03-06 ASSESSMENT — ENCOUNTER SYMPTOMS
EYES NEGATIVE: 1
GASTROINTESTINAL NEGATIVE: 1
ALLERGIC/IMMUNOLOGIC NEGATIVE: 1
CHEST TIGHTNESS: 0
COUGH: 0

## 2022-03-06 ASSESSMENT — PAIN DESCRIPTION - PAIN TYPE: TYPE: ACUTE PAIN

## 2022-03-06 ASSESSMENT — PAIN DESCRIPTION - FREQUENCY: FREQUENCY: CONTINUOUS

## 2022-03-06 ASSESSMENT — PAIN DESCRIPTION - ONSET: ONSET: PROGRESSIVE

## 2022-03-06 ASSESSMENT — PAIN - FUNCTIONAL ASSESSMENT
PAIN_FUNCTIONAL_ASSESSMENT: 0-10
PAIN_FUNCTIONAL_ASSESSMENT: ACTIVITIES ARE NOT PREVENTED

## 2022-03-06 ASSESSMENT — PAIN DESCRIPTION - ORIENTATION: ORIENTATION: RIGHT;LEFT;LOWER

## 2022-03-06 ASSESSMENT — PAIN DESCRIPTION - LOCATION: LOCATION: BACK

## 2022-03-06 ASSESSMENT — PAIN DESCRIPTION - DESCRIPTORS: DESCRIPTORS: ACHING;DISCOMFORT

## 2022-03-06 ASSESSMENT — PAIN SCALES - GENERAL: PAINLEVEL_OUTOF10: 3

## 2022-03-06 NOTE — ED PROVIDER NOTES
[facility] [id number]       Chief Complaint   Patient presents with    Back Pain     low, all the way across    Dysuria       Nurses Notes reviewed and I agree except as noted in the HPI. HISTORY OF PRESENT ILLNESS   Anshul Santoyo is a 19 y.o. female who presents The history is provided by the patient. Dysuria   This is a new problem. Associated symptoms include hematuria. Pertinent negatives include no urgency and no flank pain. started yesterday, on 2 episodes had blood on tissue. None in the toilet. Menses due next week  Is having suprapubic and low back pain. Hx of uti in the past. Denies fever or chills. Feels she is emptying her bladder sufficiently. REVIEW OF SYSTEMS     Review of Systems   Constitutional: Negative for activity change and appetite change. Eyes: Negative. Respiratory: Negative for cough and chest tightness. Cardiovascular: Negative for chest pain and leg swelling. Gastrointestinal: Negative. Endocrine: Negative. Genitourinary: Positive for dysuria and hematuria. Negative for decreased urine volume, difficulty urinating, dyspareunia, flank pain, menstrual problem, pelvic pain, urgency and vaginal bleeding. Musculoskeletal: Positive for myalgias. Skin: Negative. Allergic/Immunologic: Negative. Neurological: Negative. Hematological: Negative for adenopathy. Does not bruise/bleed easily. Psychiatric/Behavioral: Negative. PAST MEDICAL HISTORY         Diagnosis Date    Acid reflux     IBS (irritable bowel syndrome)     Lactose intolerance     UTI (urinary tract infection)     history of nephritis once       SURGICAL HISTORY     Patient  has a past surgical history that includes Tonsillectomy and Adenoidectomy.     CURRENT MEDICATIONS       Current Discharge Medication List      CONTINUE these medications which have NOT CHANGED    Details   SPRINTEC 28 0.25-35 MG-MCG per tablet TAKE 1 TABLET BY MOUTH EVERY DAY  Refills: 11      azelastine (ASTELIN) 0.1 % nasal spray 1 spray by Nasal route 2 times daily Use in each nostril as directed  Qty: 1 Bottle, Refills: 0      albuterol sulfate HFA (VENTOLIN HFA) 108 (90 Base) MCG/ACT inhaler Inhale 2 puffs into the lungs 4 times daily as needed for Wheezing or Shortness of Breath  Qty: 1 Inhaler, Refills: 0      dicyclomine (BENTYL) 20 MG tablet Take 40 mg by mouth daily      pantoprazole (PROTONIX) 40 MG tablet Take 1 tablet by mouth daily  Qty: 30 tablet, Refills: 1             ALLERGIES     Patient is is allergic to red dye. FAMILY HISTORY     Patient'sfamily history includes Asthma in her father; COPD in her father; High Blood Pressure in her mother. SOCIAL HISTORY     Patient  reports that she has never smoked. She has never used smokeless tobacco. She reports that she does not drink alcohol and does not use drugs. PHYSICAL EXAM     ED TRIAGE VITALS  BP: 117/81, Temp: 96.9 °F (36.1 °C), Pulse: 75, Resp: 14, SpO2: 98 %  Physical Exam  Vitals and nursing note reviewed. Constitutional:       Appearance: Normal appearance. She is well-developed and normal weight. HENT:      Head: Normocephalic and atraumatic. Right Ear: External ear normal.      Left Ear: External ear normal.      Nose: Nose normal.      Mouth/Throat:      Mouth: Mucous membranes are moist.      Pharynx: No oropharyngeal exudate. Eyes:      General:         Right eye: No discharge. Left eye: No discharge. Conjunctiva/sclera: Conjunctivae normal.   Neck:      Thyroid: No thyromegaly. Cardiovascular:      Rate and Rhythm: Normal rate and regular rhythm. Pulses: Normal pulses. Heart sounds: Normal heart sounds. Pulmonary:      Effort: Pulmonary effort is normal. No respiratory distress. Breath sounds: Normal breath sounds. No wheezing or rales. Chest:      Chest wall: No tenderness.    Abdominal:      General: Bowel sounds are normal. There is no distension. Palpations: Abdomen is soft. Tenderness: There is no abdominal tenderness. Musculoskeletal:         General: No tenderness. Normal range of motion. Cervical back: Normal range of motion and neck supple. No muscular tenderness. Lymphadenopathy:      Cervical: No cervical adenopathy. Skin:     General: Skin is warm and dry. Capillary Refill: Capillary refill takes less than 2 seconds. Coloration: Skin is not pale. Findings: No erythema or rash. Neurological:      General: No focal deficit present. Mental Status: She is alert and oriented to person, place, and time. Mental status is at baseline. Cranial Nerves: No cranial nerve deficit. Motor: No abnormal muscle tone. Coordination: Coordination normal.      Deep Tendon Reflexes: Reflexes are normal and symmetric. Reflexes normal.   Psychiatric:         Behavior: Behavior normal.         Thought Content:  Thought content normal.         Judgment: Judgment normal.         DIAGNOSTIC RESULTS   Labs:   Results for orders placed or performed during the hospital encounter of 03/06/22   Urinalysis   Result Value Ref Range    Glucose, Ur Negative NEGATIVE mg/dl    Bilirubin Urine Negative NEGATIVE    Ketones, Urine Negative NEGATIVE    Specific Gravity, UA 1.025 1.002 - 1.030    Blood, Urine Trace-intact NEGATIVE    pH, UA 6.50 5.0 - 9.0    Protein, UA Negative NEGATIVE mg/dl    Urobilinogen, Urine 0.20 0.2 - 1.0 eu/dl    Nitrite, Urine Negative NEGATIVE    Leukocyte Esterase, Urine Negative NEGATIVE    Color, UA Dark yellow (A) STRAW-YELLOW    Character, Urine Slightly Cloudy CLEAR-SL CLOUD       IMAGING:  No orders to display     URGENT CARE COURSE:     Vitals:    03/06/22 1015   BP: 117/81   Pulse: 75   Resp: 14   Temp: 96.9 °F (36.1 °C)   TempSrc: Temporal   SpO2: 98%   Weight: 255 lb 4 oz (115.8 kg)   Height: 5' 5.5\" (1.664 m)       Medications - No data to display  PROCEDURES:  None  FINALIMPRESSION I have reviewed the patient's medical history in detail and updated the computerized patient record. HPI/ROS per the patient and caregiver. Overall non toxic in appearance. Answers questions appropriately. Conditions discussed and addressed this visit include:     Pt with acute blood tinged fluid on tissue paper x 2 episodes yesterday. No fever. No chills. Has not increased fluids. No hx of kidney stones. UA is negative. Will have her continue to push fluids. Monitor for worsening symptoms. Follow up in urgent care or with pcp for recheck of urine if symptoms persist. Pt is agreeable to plan of care. 1. Asymptomatic microscopic hematuria        DISPOSITION/PLAN   DISPOSITION      PATIENT REFERRED TO:  Daryl Biggs MD  [facility] [phone]    In 3 days  follow up on urine and recheck for blood.     DISCHARGE MEDICATIONS:  Current Discharge Medication List        Current Discharge Medication List          YESSI Moore CNP, APRN - CNP  03/06/22 2100

## 2023-06-14 ENCOUNTER — NURSE ONLY (OUTPATIENT)
Dept: LAB | Age: 23
End: 2023-06-14

## 2023-06-20 LAB — CYTOLOGY THIN PREP PAP: NORMAL
